# Patient Record
Sex: MALE | Race: WHITE | NOT HISPANIC OR LATINO | Employment: FULL TIME | URBAN - METROPOLITAN AREA
[De-identification: names, ages, dates, MRNs, and addresses within clinical notes are randomized per-mention and may not be internally consistent; named-entity substitution may affect disease eponyms.]

---

## 2021-02-23 ENCOUNTER — OFFICE VISIT (OUTPATIENT)
Dept: URGENT CARE | Facility: PHYSICIAN GROUP | Age: 21
End: 2021-02-23
Payer: COMMERCIAL

## 2021-02-23 VITALS
HEIGHT: 69 IN | SYSTOLIC BLOOD PRESSURE: 142 MMHG | WEIGHT: 175 LBS | OXYGEN SATURATION: 95 % | BODY MASS INDEX: 25.92 KG/M2 | RESPIRATION RATE: 12 BRPM | TEMPERATURE: 97.9 F | DIASTOLIC BLOOD PRESSURE: 78 MMHG | HEART RATE: 61 BPM

## 2021-02-23 DIAGNOSIS — M79.601 RIGHT ARM PAIN: ICD-10-CM

## 2021-02-23 DIAGNOSIS — S59.901A ELBOW INJURY, RIGHT, INITIAL ENCOUNTER: ICD-10-CM
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
## 2021-02-23 DIAGNOSIS — S46.211A STRAIN OF MUSCLE, FASCIA AND TENDON OF OTHER PARTS OF BICEPS, RIGHT ARM, INITIAL ENCOUNTER: ICD-10-CM

## 2021-02-23 PROCEDURE — 99203 OFFICE O/P NEW LOW 30 MIN: CPT | Performed by: NURSE PRACTITIONER

## 2021-02-23 RX ORDER — BACLOFEN 20 MG/1
TABLET ORAL
COMMUNITY
Start: 2021-02-12

## 2021-02-23 ASSESSMENT — ENCOUNTER SYMPTOMS
FALLS: 1
BRUISES/BLEEDS EASILY: 0
MYALGIAS: 1
TINGLING: 0
WEAKNESS: 0
SENSORY CHANGE: 0

## 2021-02-23 NOTE — LETTER
February 23, 2021       Patient: Junior Baldwin   YOB: 2000   Date of Visit: 2/23/2021         To Whom It May Concern:    In my medical opinion, I recommend that Junior Baldwin be excused from work Thursday (2/25/21) and Friday (2/26/21) due to right arm injury. Recommend follow up with sports medicine if no improvement.    If you have any questions or concerns, please don't hesitate to call [phone]          Sincerely,          HARDIK Das.  Electronically Signed

## 2021-02-24 NOTE — PROGRESS NOTES
"Subjective:      Junior Baldwin is a 20 y.o. male who presents with Elbow Injury (Limited ROM, swelling, pt slipped and landed on hand, onset yesterday)            HPI  States slipped and fell onto ground on both hands/arm outstretched yesterday. Awoke today to pain of right bicep and right forearm with movement. Requesting work note. Using heat, CBD oil, baclofen, no NSAID. Pain level this morning 6/10, no movement pain level 2/10. Mother present and helps to provide info as well.     PMH:  has no past medical history on file.  MEDS:   Current Outpatient Medications:   •  baclofen (LIORESAL) 20 MG tablet, , Disp: , Rfl:   •  MAGNESIUM-OXIDE 400 (241.3 Mg) MG Tab tablet, , Disp: , Rfl:   ALLERGIES: No Known Allergies  SURGHX: History reviewed. No pertinent surgical history.  SOCHX:  reports that he has been smoking cigarettes. He has never used smokeless tobacco. He reports current alcohol use.  FH: Family history was reviewed, no pertinent findings to report    Review of Systems   Musculoskeletal: Positive for falls, joint pain and myalgias.   Neurological: Negative for tingling, sensory change and weakness.   Endo/Heme/Allergies: Does not bruise/bleed easily.   All other systems reviewed and are negative.         Objective:     /78 (BP Location: Left arm, Patient Position: Sitting, BP Cuff Size: Adult)   Pulse 61   Temp 36.6 °C (97.9 °F) (Temporal)   Resp 12   Ht 1.753 m (5' 9\")   Wt 79.4 kg (175 lb)   SpO2 95%   BMI 25.84 kg/m²      Physical Exam  Vitals reviewed.   Constitutional:       General: He is awake. He is not in acute distress.     Appearance: Normal appearance. He is well-developed. He is not ill-appearing, toxic-appearing or diaphoretic.   HENT:      Head: Normocephalic.   Eyes:      Pupils: Pupils are equal, round, and reactive to light.   Cardiovascular:      Rate and Rhythm: Normal rate.   Pulmonary:      Effort: Pulmonary effort is normal.   Musculoskeletal:         General: " Tenderness present. No swelling, deformity or signs of injury.      Right elbow: Swelling present. No deformity, effusion or lacerations. Decreased range of motion. No tenderness.      Right forearm: No swelling, edema, deformity, lacerations, tenderness or bony tenderness.      Right wrist: Normal.      Right hand: Normal.      Comments: No muscle bulging of bicep or forearm. Mild swelling at olecranon region. No redness/bruising, deformity or TTP at upper, elbow or forearm. FROM of hand, wrist. Decreased ROM at elbow joint due to pain at bicep region and pulling sensation of posterior elbow. Med assist applied right arm sling.   Skin:     General: Skin is warm and dry.      Coloration: Skin is not ashen, cyanotic, jaundiced, mottled, pale or sallow.      Findings: No abrasion, bruising, ecchymosis, erythema, signs of injury, laceration or wound.   Neurological:      Mental Status: He is alert and oriented to person, place, and time.   Psychiatric:         Behavior: Behavior is cooperative.                 Assessment/Plan:        1. Elbow injury, right, initial encounter    - REFERRAL TO SPORTS MEDICINE    2. Right arm pain  - REFERRAL TO SPORTS MEDICINE    3. Fall, initial encounter    - REFERRAL TO SPORTS MEDICINE    4. Strain of muscle, fascia and tendon of other parts of biceps, right arm, initial encounter    - REFERRAL TO SPORTS MEDICINE    May use NSAID prn for pain/swelling  May use cool compresses for swelling prn  May utilize RICE method prn, use arm sling prn  Avoid excessive weight bearing to avoid further injury   May apply topical analgesics prn  Perform proper body mechanics with lift/carry, push/pull  Monitor for numbness/tingling in fingers, decreased ROM with lifting difficulty- need re-evaluation  F/u Sports Med

## 2021-03-01 ENCOUNTER — OFFICE VISIT (OUTPATIENT)
Dept: URGENT CARE | Facility: PHYSICIAN GROUP | Age: 21
End: 2021-03-01
Payer: COMMERCIAL

## 2021-03-01 VITALS
OXYGEN SATURATION: 95 % | BODY MASS INDEX: 26.16 KG/M2 | RESPIRATION RATE: 16 BRPM | WEIGHT: 176.6 LBS | TEMPERATURE: 98.5 F | SYSTOLIC BLOOD PRESSURE: 112 MMHG | HEIGHT: 69 IN | DIASTOLIC BLOOD PRESSURE: 64 MMHG | HEART RATE: 67 BPM

## 2021-03-01 DIAGNOSIS — S59.901D ELBOW INJURY, RIGHT, SUBSEQUENT ENCOUNTER: ICD-10-CM

## 2021-03-01 PROCEDURE — 99213 OFFICE O/P EST LOW 20 MIN: CPT | Performed by: PHYSICIAN ASSISTANT

## 2021-03-01 ASSESSMENT — ENCOUNTER SYMPTOMS
FALLS: 1
NUMBNESS: 0
MUSCLE WEAKNESS: 0
TINGLING: 0
SENSORY CHANGE: 0

## 2021-03-01 NOTE — PROGRESS NOTES
Subjective:   Junior Baldwin is a 20 y.o. male who presents today with   Chief Complaint   Patient presents with   • Arm Problem     bruising, slight swelling, bones ache, x6 days      Patient's mother is present today.  Arm Pain   The incident occurred 5 to 7 days ago. The incident occurred at home. The injury mechanism was a fall. The pain is present in the right elbow. The quality of the pain is described as aching. The pain is mild. The pain has been fluctuating since the incident. Pertinent negatives include no chest pain, muscle weakness, numbness or tingling. The symptoms are aggravated by movement. He has tried rest for the symptoms. The treatment provided mild relief.     Patient was seen last week.  Patient states he slipped about 6 days ago injuring his right arm and elbow.  He is requesting an updated work note today until he follows up with sports medicine, earliest appointment was next week.  He states that his arm is progressively getting better but still feels pain with certain movements.  He has been working on his range of motion without the sling and noted significant improvement.    PMH:  has no past medical history on file.  MEDS:   Current Outpatient Medications:   •  baclofen (LIORESAL) 20 MG tablet, , Disp: , Rfl:   •  MAGNESIUM-OXIDE 400 (241.3 Mg) MG Tab tablet, , Disp: , Rfl:   ALLERGIES: No Known Allergies  SURGHX: History reviewed. No pertinent surgical history.  SOCHX:  reports that he has been smoking cigarettes. He has never used smokeless tobacco. He reports current alcohol use.  FH: Reviewed with patient, not pertinent to this visit.       Review of Systems   Cardiovascular: Negative for chest pain.   Musculoskeletal: Positive for falls.        Right arm pain   Neurological: Negative for tingling, sensory change and numbness.        Objective:   /64 (BP Location: Left arm, Patient Position: Sitting, BP Cuff Size: Adult)   Pulse 67   Temp 36.9 °C (98.5 °F) (Temporal)    "Resp 16   Ht 1.753 m (5' 9\")   Wt 80.1 kg (176 lb 9.6 oz)   SpO2 95%   BMI 26.08 kg/m²   Physical Exam  Vitals and nursing note reviewed.   Constitutional:       General: He is not in acute distress.     Appearance: Normal appearance. He is well-developed.   HENT:      Head: Normocephalic and atraumatic.      Right Ear: Hearing normal.      Left Ear: Hearing normal.   Eyes:      Pupils: Pupils are equal, round, and reactive to light.   Cardiovascular:      Rate and Rhythm: Normal rate and regular rhythm.      Heart sounds: Normal heart sounds.   Pulmonary:      Effort: Pulmonary effort is normal.   Musculoskeletal:      Comments: Full range of motion of the right upper extremity at the shoulder.  No bony tenderness to palpation to the shoulder, elbow or hand.  Mild soft tissue tenderness to palpation to the medial portion of the right elbow over the area of bruising.  No tenderness to anatomical snuffbox. Full strength and ROM against resistance with flexion and extension of the right upper extremity at the level of the elbow. NVI distally, less than 2 capillary refill. Mild pain with pronation and supination.   Skin:     General: Skin is warm and dry.             Comments: Yellow ecchymosis to the right elbow appears to be well-healing.   Neurological:      Mental Status: He is alert.      Coordination: Coordination normal.   Psychiatric:         Mood and Affect: Mood normal.       Assessment/Plan:   Assessment    1. Elbow injury, right, subsequent encounter  Patient could perform light duty/desk work in my opinion but he states his job does not have any accommodations for this and would like to follow up with sports medicine prior to return. Continue with rest, ice, gentle ROM stretches and exercises as discussed. No bony TTP today therefore no indication for imaging. Still consistent with strain with improving symptoms.   RICE TREATMENT FOR EXTREMITY INJURIES:  R-rest the extremity as much as possible while " pain and swelling persist  I-ice the extremity 15 minutes every 2 hours for the first 24 hours, then 4-5 times daily   C-compress the extremity either with splint or ace wrap as directed  E-elevate the extremity to help with swelling  Follow up with sports medicine.    Please note that this dictation was created using voice recognition software. I have made every reasonable attempt to correct obvious errors, but I expect that there are errors of grammar and possibly content that I did not discover before finalizing the note.    Torsten Jacobson PA-C

## 2021-03-01 NOTE — LETTER
March 1, 2021         Patient: Junior Baldwin   YOB: 2000   Date of Visit: 3/1/2021           To Whom it May Concern:    Junior Baldwin was seen in my clinic on 3/1/2021. He should remain out of work until Sports Medicine evaluation 3/9/21.    If you have any questions or concerns, please don't hesitate to call.        Sincerely,           Torsten aJcobson P.A.-C.  Electronically Signed